# Patient Record
Sex: FEMALE | Race: WHITE
[De-identification: names, ages, dates, MRNs, and addresses within clinical notes are randomized per-mention and may not be internally consistent; named-entity substitution may affect disease eponyms.]

---

## 2020-07-17 ENCOUNTER — HOSPITAL ENCOUNTER (INPATIENT)
Dept: HOSPITAL 1 - ED | Age: 70
LOS: 2 days | Discharge: HOME | DRG: 378 | End: 2020-07-19
Attending: FAMILY MEDICINE | Admitting: FAMILY MEDICINE
Payer: COMMERCIAL

## 2020-07-17 VITALS — SYSTOLIC BLOOD PRESSURE: 139 MMHG | DIASTOLIC BLOOD PRESSURE: 51 MMHG

## 2020-07-17 VITALS — WEIGHT: 145 LBS | HEIGHT: 62 IN | BODY MASS INDEX: 26.68 KG/M2

## 2020-07-17 VITALS — SYSTOLIC BLOOD PRESSURE: 149 MMHG | DIASTOLIC BLOOD PRESSURE: 44 MMHG

## 2020-07-17 DIAGNOSIS — Z90.710: ICD-10-CM

## 2020-07-17 DIAGNOSIS — D64.9: ICD-10-CM

## 2020-07-17 DIAGNOSIS — K31.811: Primary | ICD-10-CM

## 2020-07-17 DIAGNOSIS — K57.91: ICD-10-CM

## 2020-07-17 DIAGNOSIS — D62: ICD-10-CM

## 2020-07-17 DIAGNOSIS — E78.00: ICD-10-CM

## 2020-07-17 DIAGNOSIS — Z87.891: ICD-10-CM

## 2020-07-17 DIAGNOSIS — D47.3: ICD-10-CM

## 2020-07-17 DIAGNOSIS — I10: ICD-10-CM

## 2020-07-17 DIAGNOSIS — E78.5: ICD-10-CM

## 2020-07-17 DIAGNOSIS — Z90.49: ICD-10-CM

## 2020-07-17 LAB
ALBUMIN SERPL-MCNC: 3.7 G/DL (ref 3.4–5)
ALP SERPL-CCNC: 81 U/L (ref 46–116)
ALT SERPL-CCNC: 19 U/L (ref 14–59)
AMYLASE SERPL-CCNC: 46 U/L (ref 25–115)
AST SERPL-CCNC: 15 U/L (ref 15–37)
BASOPHILS NFR BLD: 1.3 % (ref 0–2)
BILIRUB SERPL-MCNC: 0.4 MG/DL (ref 0.2–1)
BUN SERPL-MCNC: 7 MG/DL (ref 7–18)
CALCIUM SERPL-MCNC: 8.8 MG/DL (ref 8.5–10.1)
CHLORIDE SERPL-SCNC: 105 MMOL/L (ref 98–107)
CHOLEST SERPL-MCNC: 92 MG/DL (ref ?–200)
CHOLEST/HDLC SERPL: 2.4 MG/DL
CO2 SERPL-SCNC: 25.5 MMOL/L (ref 21–32)
CREAT SERPL-MCNC: 0.6 MG/DL (ref 0.6–1)
ERYTHROCYTE [DISTWIDTH] IN BLOOD BY AUTOMATED COUNT: 20.4 % (ref 11.5–14.5)
GFR SERPLBLD BASED ON 1.73 SQ M-ARVRAT: (no result) ML/MIN
GLUCOSE SERPL-MCNC: 101 MG/DL (ref 74–106)
HDLC SERPL-MCNC: 39 MG/DL (ref 40–60)
IRON SERPL-MCNC: 12 UG/DL (ref 50–170)
LIPASE SERPL-CCNC: 100 IU/L (ref 73–393)
MAGNESIUM SERPL-MCNC: 2.1 MG/DL (ref 1.8–2.4)
PHOSPHATE SERPL-MCNC: 3.1 MG/DL (ref 2.5–4.9)
PLATELET # BLD: 479 X10^3MCL (ref 130–400)
POTASSIUM SERPL-SCNC: 4.2 MMOL/L (ref 3.5–5.1)
PROT SERPL-MCNC: 7.2 G/DL (ref 6.4–8.2)
RBC # BLD AUTO: 3.31 M/MM3 (ref 4.1–5.1)
RBC MORPH BLD: (no result)
SODIUM SERPL-SCNC: 139 MMOL/L (ref 136–145)
T3 SERPL-MCNC: 1.42 NG/ML
T3RU NFR SERPL: 33 % UPTAKE (ref 30–39)
T4 FREE SERPL-MCNC: 0.96 NG/DL (ref 0.76–1.46)
T4 SERPL-MCNC: 7.1 UG/DL (ref 4.7–13.3)
T4/T3 UPTAKE INDEX SERPL: 2.3 UG/DL (ref 1.4–4.5)
TIBC SERPL-MCNC: 471 UG/DL (ref 250–450)
TRIGL SERPL-MCNC: 102 MG/DL (ref ?–150)

## 2020-07-17 PROCEDURE — P9016 RBC LEUKOCYTES REDUCED: HCPCS

## 2020-07-17 PROCEDURE — 30233N1 TRANSFUSION OF NONAUTOLOGOUS RED BLOOD CELLS INTO PERIPHERAL VEIN, PERCUTANEOUS APPROACH: ICD-10-PCS | Performed by: FAMILY MEDICINE

## 2020-07-17 PROCEDURE — G0378 HOSPITAL OBSERVATION PER HR: HCPCS

## 2020-07-17 PROCEDURE — C9113 INJ PANTOPRAZOLE SODIUM, VIA: HCPCS

## 2020-07-17 NOTE — NUR
1 UNIT PRBC'S INITIATED AND VSS. PT ALERT AND ORIENTED AND EDUCATED ON S/S
REACTIONS. VERBALIZED UNDERSTANDING

## 2020-07-17 NOTE — NUR
RECEIVED PT FROM ER, PT ADMIT FOR ANEMIA, GI BLEED. PT IS A/O X4, VERBAL
RESPONSIVE. LUNG SOUND CLEAR BILATERAL, NO COUGH, NO SOB. PT IS ON TELE 13, ST
AT THIS MOMENT, BOWEL SOUND PRESENT ALL 4 QUADRANTS, NO DISTENTION, NO TENDER.
PEDAL PULSE PRESENT BOTH FEEET, NO EDEMA, IV AT LEFT AC, NO LEAKING, NO
INFILTRATION. PT CONTINUE ON BLOOD TRANSFUSION FROM ER, FINISHED AT 1930, PT
TOLERATED WELL, WILL CONTINUE TO MONITOR PT.

## 2020-07-17 NOTE — NUR
PT HERE FOR LOW HGB PER PMD OFFICE. CLAIMS SHE WENT IN FOR ROUTINE LABWORK AND
RECEIVED A CALL YESTERDAY FOR HGB IN THE 5 RANGE. DENIES ANY RECENT BLEEDING OR
DARK STOOLS. DENIES PAIN.PT ARRIVES ALERT AND ORIENTED WITH NO DISTRESS NOTED
AND VSS. ONLY COMPLAINT IS OCCASONAL FATIGUE. PT CONNECTED TO CARDIAC MONITOR
AND AWAITING MD HOSKINS

## 2020-07-18 VITALS — DIASTOLIC BLOOD PRESSURE: 52 MMHG | SYSTOLIC BLOOD PRESSURE: 127 MMHG

## 2020-07-18 VITALS — DIASTOLIC BLOOD PRESSURE: 53 MMHG | SYSTOLIC BLOOD PRESSURE: 119 MMHG

## 2020-07-18 VITALS — DIASTOLIC BLOOD PRESSURE: 50 MMHG | SYSTOLIC BLOOD PRESSURE: 115 MMHG

## 2020-07-18 VITALS — SYSTOLIC BLOOD PRESSURE: 126 MMHG | DIASTOLIC BLOOD PRESSURE: 57 MMHG

## 2020-07-18 VITALS — SYSTOLIC BLOOD PRESSURE: 103 MMHG | DIASTOLIC BLOOD PRESSURE: 55 MMHG

## 2020-07-18 VITALS — DIASTOLIC BLOOD PRESSURE: 44 MMHG | SYSTOLIC BLOOD PRESSURE: 122 MMHG

## 2020-07-18 LAB
BASOPHILS NFR BLD: 0.4 % (ref 0–2)
BASOPHILS NFR BLD: 0.7 % (ref 0–2)
BUN SERPL-MCNC: 5 MG/DL (ref 7–18)
CALCIUM SERPL-MCNC: 8.4 MG/DL (ref 8.5–10.1)
CHLORIDE SERPL-SCNC: 108 MMOL/L (ref 98–107)
CO2 SERPL-SCNC: 25 MMOL/L (ref 21–32)
CREAT SERPL-MCNC: 0.5 MG/DL (ref 0.6–1)
ERYTHROCYTE [DISTWIDTH] IN BLOOD BY AUTOMATED COUNT: 21.5 % (ref 11.5–14.5)
ERYTHROCYTE [DISTWIDTH] IN BLOOD BY AUTOMATED COUNT: 23.9 % (ref 11.5–14.5)
GFR SERPLBLD BASED ON 1.73 SQ M-ARVRAT: (no result) ML/MIN
GLUCOSE SERPL-MCNC: 90 MG/DL (ref 74–106)
MAGNESIUM SERPL-MCNC: 2.2 MG/DL (ref 1.8–2.4)
PHOSPHATE SERPL-MCNC: 2.8 MG/DL (ref 2.5–4.9)
PLATELET # BLD: 402 X10^3MCL (ref 130–400)
PLATELET # BLD: 416 X10^3MCL (ref 130–400)
POTASSIUM SERPL-SCNC: 4.1 MMOL/L (ref 3.5–5.1)
RBC MORPH BLD: (no result)
RBC MORPH BLD: (no result)
SODIUM SERPL-SCNC: 141 MMOL/L (ref 136–145)

## 2020-07-18 NOTE — NUR
TRANSFUSION OF 1 UNIT PRBC INITIATED, PRE INFUSION VITAL SIGNS 131/57, 97.1,
[place] 74 RR 16, WILL MONITOR X 15 MINUTES

## 2020-07-18 NOTE — NUR
PATIENT RESTING COMFORTABLY, IN BED NO C/O PAIN OR DISCOMFORT AT THIS TIME,
WILL ENDORSE CARE TO PM NURSE

## 2020-07-18 NOTE — NUR
RECEIVED PATIENT FROM PM NURSE, ALERT AND ORIENTED X 4, NO C/O OF PAIN OR
DISCOMFORT, LUNG SOUNDS CLEAR ON RA, PULSE OX 99%, ABLE TO AMBULATE
INDEPENDENTLY, IV IN LW PATENT AND INFUSING NS @ 100ML/HR, ON TELEMTRY,
CURRENTLY NSR, BOWEL SOUNDS ACTIVE ABDOMEN NON DISTENDED, LAST BM 7/17,
CONTINENT OF BOWEL AND BLADDER, SKIN INTACT, PEDAL PULSES EQUAL AND MODERATE,
NO EDEMA NOTED, PATIENT CALM AND COOPERATIVE

## 2020-07-18 NOTE — NUR
T/C FROM LAB REPORTING CRITICAL LAB VALUE, HGB 6.9 HCT 24, RESULTS RELAYED TO
MD, ORDERS OBTAINED TO TRANFUSE 1 U PRBC

## 2020-07-18 NOTE — NUR
@ 0200 RESTING COMFORTABLY IN NO ACUTE DISTRESS.IVF INFUSING WELL @ 100 ML/HR.
CALL LIGHT WITHIN REACH.

## 2020-07-18 NOTE — NUR
ENDORSED IN NO ACUTE DISTRESS.NO ACTIVE BLEEDING NOTED.IVF NS INFUSING WELL @
100 ML/HR.SAFETY MAINTAINED.

## 2020-07-19 VITALS — SYSTOLIC BLOOD PRESSURE: 116 MMHG | DIASTOLIC BLOOD PRESSURE: 40 MMHG

## 2020-07-19 VITALS — DIASTOLIC BLOOD PRESSURE: 55 MMHG | SYSTOLIC BLOOD PRESSURE: 130 MMHG

## 2020-07-19 VITALS — DIASTOLIC BLOOD PRESSURE: 53 MMHG | SYSTOLIC BLOOD PRESSURE: 113 MMHG

## 2020-07-19 VITALS — DIASTOLIC BLOOD PRESSURE: 53 MMHG | SYSTOLIC BLOOD PRESSURE: 110 MMHG

## 2020-07-19 VITALS — SYSTOLIC BLOOD PRESSURE: 110 MMHG | DIASTOLIC BLOOD PRESSURE: 53 MMHG

## 2020-07-19 LAB
BASOPHILS NFR BLD: 0 % (ref 0–2)
BUN SERPL-MCNC: 3 MG/DL (ref 7–18)
CALCIUM SERPL-MCNC: 8.9 MG/DL (ref 8.5–10.1)
CHLORIDE SERPL-SCNC: 108 MMOL/L (ref 98–107)
CO2 SERPL-SCNC: 24.2 MMOL/L (ref 21–32)
CREAT SERPL-MCNC: 0.5 MG/DL (ref 0.6–1)
ERYTHROCYTE [DISTWIDTH] IN BLOOD BY AUTOMATED COUNT: 24.2 % (ref 11.5–14.5)
GFR SERPLBLD BASED ON 1.73 SQ M-ARVRAT: (no result) ML/MIN
GLUCOSE SERPL-MCNC: 91 MG/DL (ref 74–106)
MAGNESIUM SERPL-MCNC: 2.2 MG/DL (ref 1.8–2.4)
PHOSPHATE SERPL-MCNC: 3 MG/DL (ref 2.5–4.9)
PLATELET # BLD: 399 X10^3MCL (ref 130–400)
POTASSIUM SERPL-SCNC: 3.7 MMOL/L (ref 3.5–5.1)
RBC MORPH BLD: (no result)
SODIUM SERPL-SCNC: 143 MMOL/L (ref 136–145)

## 2020-07-19 PROCEDURE — 0DJD8ZZ INSPECTION OF LOWER INTESTINAL TRACT, VIA NATURAL OR ARTIFICIAL OPENING ENDOSCOPIC: ICD-10-PCS | Performed by: INTERNAL MEDICINE

## 2020-07-19 PROCEDURE — 0W3P8ZZ CONTROL BLEEDING IN GASTROINTESTINAL TRACT, VIA NATURAL OR ARTIFICIAL OPENING ENDOSCOPIC: ICD-10-PCS | Performed by: INTERNAL MEDICINE

## 2020-07-19 PROCEDURE — 0DB68ZX EXCISION OF STOMACH, VIA NATURAL OR ARTIFICIAL OPENING ENDOSCOPIC, DIAGNOSTIC: ICD-10-PCS | Performed by: INTERNAL MEDICINE

## 2020-07-19 NOTE — NUR
PATIENT RETURNED FROM OR, SEMI AWAKE, RESPONSIVE TO VERBAL STIMULI, /55,
WILL CONTINUE TO MONITOR FOR PAIN

## 2020-07-19 NOTE — NUR
TALKED TO DR. ALVAREZ REGARDING THE DISCHARGE PLAN. AND REQUEST FOR WRITTEN
PRESCRIPTION FOR THE NEW MEDICATION. DR. ALVAREZ MADE AWARE STATE WILL DO THAT
SOON AS SOON AS MEETING FINISHED.

## 2020-07-19 NOTE — NUR
PATIENT TRANSPORTED TRO HAVE EGD AND COLONOSCOY DONE BY , REPORT GIVEN
TO RECEIVING NURSE, ALL CONSENTS COMPLET, WILL RESUME PATIENTS CARE ONCE SHE
RETURNS

## 2020-07-19 NOTE — NUR
ALL DISCHARGE INSTRUCTION AND DOCUMENT WAS GIVEN TO PT. ALL DISCHARGE PAPER
SIGNED. IV REMOVED. PT IS DRESSING UP AND WAITING FOR THE RIDE. WILL CONTINUE
TO MONITOR THE PT.

## 2020-07-19 NOTE — NUR
@ 0400 RESTING COMFORTABLY IN NO ACUTE DISTRESS.IVF NS INFUSING WELL @ 100
ML/HR.CALL LIGHT WITHIN REACH.

## 2020-07-19 NOTE — NUR
PATIENT RESTING COMFORTABLY, NO S/SX OF DISTRESS OR DISCOMFORT AT THIS TIME,
WILL ENDORSE CARE TO PM NURSE

## 2020-07-19 NOTE — NUR
PT LEFT THE FLOOR BY WHEEL CHAIR ACCOMPANISH BY RN. PT IS A/O X4, VERBAL
RESPONSIVE. NO S/S OF CHEST PAIN OR DISCOMFORT, NO S/S OF SOB PIOR THE
DISCHARGE.

## 2020-07-19 NOTE — NUR
@ 1945 A/A/O X4.DENIES ANY DISCOMFORT NOR CP @ THIS TIME.DENIES SOB. NO IV
ACCESS .IV INSERTED ON HER LEFT HAND WITH ANGIO # 22.1ST UNIT OF PRBC
INFUSING WELL.